# Patient Record
Sex: FEMALE | Race: WHITE
[De-identification: names, ages, dates, MRNs, and addresses within clinical notes are randomized per-mention and may not be internally consistent; named-entity substitution may affect disease eponyms.]

---

## 2017-07-31 ENCOUNTER — HOSPITAL ENCOUNTER (OUTPATIENT)
Dept: HOSPITAL 35 - RAD | Age: 77
End: 2017-07-31
Attending: FAMILY MEDICINE
Payer: COMMERCIAL

## 2017-07-31 DIAGNOSIS — Z12.31: Primary | ICD-10-CM

## 2019-12-03 ENCOUNTER — HOSPITAL ENCOUNTER (OUTPATIENT)
Dept: HOSPITAL 35 - RAD | Age: 79
End: 2019-12-03
Attending: FAMILY MEDICINE
Payer: COMMERCIAL

## 2019-12-03 DIAGNOSIS — Z12.31: Primary | ICD-10-CM

## 2019-12-09 ENCOUNTER — HOSPITAL ENCOUNTER (OUTPATIENT)
Dept: HOSPITAL 35 - GI | Age: 79
Discharge: HOME | End: 2019-12-09
Attending: SPECIALIST
Payer: COMMERCIAL

## 2019-12-09 VITALS — BODY MASS INDEX: 30.12 KG/M2 | WEIGHT: 170 LBS | HEIGHT: 62.99 IN

## 2019-12-09 DIAGNOSIS — Z98.890: ICD-10-CM

## 2019-12-09 DIAGNOSIS — Z88.8: ICD-10-CM

## 2019-12-09 DIAGNOSIS — I10: ICD-10-CM

## 2019-12-09 DIAGNOSIS — K64.4: ICD-10-CM

## 2019-12-09 DIAGNOSIS — G43.909: ICD-10-CM

## 2019-12-09 DIAGNOSIS — Z80.0: ICD-10-CM

## 2019-12-09 DIAGNOSIS — Z79.899: ICD-10-CM

## 2019-12-09 DIAGNOSIS — Z96.651: ICD-10-CM

## 2019-12-09 DIAGNOSIS — Z90.710: ICD-10-CM

## 2019-12-09 DIAGNOSIS — J45.909: ICD-10-CM

## 2019-12-09 DIAGNOSIS — Z87.891: ICD-10-CM

## 2019-12-09 DIAGNOSIS — Z12.11: Primary | ICD-10-CM

## 2019-12-09 DIAGNOSIS — M19.90: ICD-10-CM

## 2019-12-09 PROCEDURE — 62900: CPT

## 2019-12-09 PROCEDURE — 62110: CPT

## 2019-12-10 NOTE — P
[facility]
Fausto Aguayo
[place]   [address]                     PROCEDURE REPORT              
_______________________________________________________________________________
 
Name:       SAMMI HANKINS                 [facility] #:                     [facility]      [id number]                       Account #:      [id number]  
Admission:  12/09/19    Attend Phys:    Marcio Casillas
Discharge:              Date of Birth:  [birth year]  
                                                          Report #: [id number]
                                                                    [id number]   
_______________________________________________________________________________
THIS REPORT FOR:   //name//                          
 
CC: Marcio Hameed MD
 
DATE OF SERVICE:  12/09/2019
 
 
PROCEDURE PERFORMED:  Colonoscopy.
 
HISTORY OF PRESENT ILLNESS:  The patient is a 78-year-old female who presents
today for routine screening colonoscopy.  Last colonoscopy was 5 years ago.  She
denies any symptoms, family history of colon cancer in her mother.
 
DESCRIPTION OF PROCEDURE:  The risks and benefits of the procedure were
explained to the patient, those risks including but not limited to bleeding,
perforation, the risk of sedation.  She understood these risks and gave informed
consent.  Sedation was given using propofol per anesthesia.  Next, a digital
rectal exam was initially performed, which showed external hemorrhoids,
otherwise normal.  Next, using a standard Olympus colonoscope, the scope was
placed in the patient's anus and advanced under direct vision to the cecum.  The
overall prep was excellent.  The cecum and ileocecal valve were normal in
appearance.  The ascending, transverse, descending and sigmoid colon were all
normal.  The rectal mucosa was normal.  On retroflexion, no abnormalities were
noted.  The scope was then withdrawn and the procedure terminated.  The patient
tolerated the procedure well.
 
IMPRESSION:
1.  External hemorrhoids.
2.  Otherwise, normal colonoscopy.
 
RECOMMENDATIONS:  Repeat colonoscopy in 5 years.
 
Thank you for allowing me to participate in her care.
 
 
 
 
 
 
 
  <ELECTRONICALLY SIGNED>
   By: Marcio Raman MD   
  12/10/19     [id number]
D: 12/09/19 0936                           _____________________________________
T: 12/09/19 1507                           Marcio Raman MD     /nt